# Patient Record
(demographics unavailable — no encounter records)

---

## 2024-11-18 NOTE — ASSESSMENT
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

## 2024-11-18 NOTE — HISTORY OF PRESENT ILLNESS
[de-identified] : many y hx as hearing loss since childhood ad tinnitus but nohering loss on rt had mri in past x 2 had autoimmune work up no vertigo

## 2024-11-18 NOTE — REVIEW OF SYSTEMS
[Patient Intake Form Reviewed] : Patient intake form was reviewed [Negative] : Ear [de-identified] : hearing deficit in left ear since 14, ongoing tinnitus since age of 14, pressure change since Friday, tinnitus is quieter,

## 2024-12-02 NOTE — REVIEW OF SYSTEMS
[Hearing Loss] : hearing loss [Patient Intake Form Reviewed] : Patient intake form was reviewed [Negative] : Ear [de-identified] : tinnitus ongoing

## 2024-12-02 NOTE — HISTORY OF PRESENT ILLNESS
[de-identified] : f/u as tinnitus and hearing loss no improvement w pred taper no vertigo age 14 as hearing loss worse over time ad tinnitus recent and ad sn loss not on prior audios now ad tinniuts loud

## 2024-12-02 NOTE — DATA REVIEWED
[de-identified] :  Type A tymps, AU. Testing via inserts: AD- WNL up to 4kHz, sloping to a moderate HFHL. AS- WNL up to .75kHz, sloping sharply to a severe to profound essentially SNHL. Recs: 1) F/u w/ MD 2) Consider CI eval & further tests as per MD 3) Annual

## 2024-12-02 NOTE — ASSESSMENT
[FreeTextEntry1] : sn loss ad was new finding completed pred audio today  as moderate to severe loss ad high freq loss-no change after pred consider as hearing aid would like otology consult

## 2025-01-30 NOTE — PHYSICAL EXAM
[Chaperone Present] : A chaperone was present in the examining room during all aspects of the physical examination [32150] : A chaperone was present during the pelvic exam. [Appropriately responsive] : appropriately responsive [Alert] : alert [No Acute Distress] : no acute distress [No Lymphadenopathy] : no lymphadenopathy [Soft] : soft [Non-tender] : non-tender [Non-distended] : non-distended [No HSM] : No HSM [No Lesions] : no lesions [No Mass] : no mass [Oriented x3] : oriented x3 [Examination Of The Breasts] : a normal appearance [No Masses] : no breast masses were palpable [Labia Majora] : normal [Labia Minora] : normal [Normal] : normal [Uterine Adnexae] : normal [Normal rectal exam] : was normal [FreeTextEntry2] : DONNELL CarterC [Occult Blood Positive] : was negative for occult blood analysis

## 2025-03-24 NOTE — REASON FOR VISIT
[Initial Consultation] : an initial consultation for [FreeTextEntry2] : Consul for hearing aids or cochlear implants

## 2025-03-24 NOTE — PROCEDURE
[FreeTextEntry3] : Procedure note:  Binocular microscopy  Mar 24, 2025   Inspection of the ears was performed under binocular microscopy because of need to accurately visualize otologic landmarks and potential pathologic conditions that would not otherwise be visible through simple otoscopic exam.

## 2025-03-24 NOTE — ASSESSMENT
[FreeTextEntry1] : 42-year-old female with longstanding left sensorineural hearing loss present since childhood.  Has previously been followed by other ENT physicians for this.  More recently, told she had hearing loss in the high frequencies in the right ear.  Has intermittent bilateral tinnitus.  Exam today shows intact bilateral tympanic membranes without effusion or retraction, bilateral facial nerve function is symmetric.  I reviewed an audiogram which shows a left asymmetric sensorineural hearing loss with speech discrimination scores of 60% in the left ear, while the right ear has a high-frequency hearing loss at 6 and 8 kHz but otherwise normal hearing, 100% speech discrimination scores in the right ear.  Asked patient to send a copy of her prior audiogram results and MRI results to my office for review.  Recommended trial of hearing aid for left ear.  She is not currently a left CI candidate based on SSD criteria.  Recommended moving forward with genetics evaluations as previously recommended by Dr. Godoy.  I spent a total of 30 minutes on the date of the encounter evaluating and treating the patient.

## 2025-03-24 NOTE — REVIEW OF SYSTEMS
[Recurrent Sinus Infections] : recurrent sinus infections [Palpitations] : palpitations [Anxiety] : anxiety [Depression] : depression [Negative] : Heme/Lymph [de-identified] : Some ear infections  [FreeTextEntry1] : Fatigue